# Patient Record
Sex: MALE | Race: WHITE | URBAN - METROPOLITAN AREA
[De-identification: names, ages, dates, MRNs, and addresses within clinical notes are randomized per-mention and may not be internally consistent; named-entity substitution may affect disease eponyms.]

---

## 2023-05-25 ENCOUNTER — NEW PATIENT (OUTPATIENT)
Dept: URBAN - METROPOLITAN AREA CLINIC 20 | Facility: CLINIC | Age: 68
End: 2023-05-25

## 2023-05-25 DIAGNOSIS — Z96.1: ICD-10-CM

## 2023-05-25 DIAGNOSIS — E11.3213: ICD-10-CM

## 2023-05-25 PROCEDURE — 92134 CPTRZ OPH DX IMG PST SGM RTA: CPT

## 2023-05-25 PROCEDURE — 92004 COMPRE OPH EXAM NEW PT 1/>: CPT

## 2023-05-25 ASSESSMENT — VISUAL ACUITY: OD_CC: 20/30

## 2023-05-25 ASSESSMENT — TONOMETRY
OS_IOP_MMHG: 13
OD_IOP_MMHG: 14

## 2023-06-14 ENCOUNTER — ESTABLISHED PATIENT (OUTPATIENT)
Dept: URBAN - METROPOLITAN AREA CLINIC 20 | Facility: CLINIC | Age: 68
End: 2023-06-14

## 2023-06-14 DIAGNOSIS — H35.373: ICD-10-CM

## 2023-06-14 DIAGNOSIS — H43.813: ICD-10-CM

## 2023-06-14 DIAGNOSIS — E11.3213: ICD-10-CM

## 2023-06-14 PROCEDURE — 92201 OPSCPY EXTND RTA DRAW UNI/BI: CPT

## 2023-06-14 PROCEDURE — 92134 CPTRZ OPH DX IMG PST SGM RTA: CPT

## 2023-06-14 PROCEDURE — 67028 INJECTION EYE DRUG: CPT

## 2023-06-14 PROCEDURE — 99214 OFFICE O/P EST MOD 30 MIN: CPT

## 2023-06-14 ASSESSMENT — VISUAL ACUITY
OD_CC: 20/40-2
OS_CC: 20/60-1

## 2023-06-14 ASSESSMENT — TONOMETRY
OS_IOP_MMHG: 12
OD_IOP_MMHG: 12

## 2023-07-19 ENCOUNTER — ESTABLISHED PATIENT (OUTPATIENT)
Dept: URBAN - METROPOLITAN AREA CLINIC 20 | Facility: CLINIC | Age: 68
End: 2023-07-19

## 2023-07-19 DIAGNOSIS — E11.3513: ICD-10-CM

## 2023-07-19 DIAGNOSIS — H35.373: ICD-10-CM

## 2023-07-19 PROCEDURE — 67028 INJECTION EYE DRUG: CPT

## 2023-07-19 PROCEDURE — 92134 CPTRZ OPH DX IMG PST SGM RTA: CPT

## 2023-07-19 ASSESSMENT — VISUAL ACUITY
OD_CC: 20/50
OS_CC: 20/80-2

## 2023-07-19 ASSESSMENT — TONOMETRY
OD_IOP_MMHG: 14
OS_IOP_MMHG: 15

## 2023-08-23 ENCOUNTER — ESTABLISHED PATIENT (OUTPATIENT)
Dept: URBAN - METROPOLITAN AREA CLINIC 20 | Facility: CLINIC | Age: 68
End: 2023-08-23

## 2023-08-23 DIAGNOSIS — E11.3513: ICD-10-CM

## 2023-08-23 DIAGNOSIS — H35.373: ICD-10-CM

## 2023-08-23 PROCEDURE — 67028 INJECTION EYE DRUG: CPT

## 2023-08-23 PROCEDURE — 92134 CPTRZ OPH DX IMG PST SGM RTA: CPT

## 2023-08-23 ASSESSMENT — VISUAL ACUITY
OU_CC: 20/40
OS_CC: 20/70
OD_CC: 20/30-2
OS_PH: 20/60

## 2023-08-23 ASSESSMENT — TONOMETRY
OS_IOP_MMHG: 11
OD_IOP_MMHG: 13

## 2023-09-06 ENCOUNTER — ESTABLISHED PATIENT (OUTPATIENT)
Dept: URBAN - METROPOLITAN AREA CLINIC 20 | Facility: CLINIC | Age: 68
End: 2023-09-06

## 2023-09-06 DIAGNOSIS — E11.3513: ICD-10-CM

## 2023-09-06 DIAGNOSIS — H35.373: ICD-10-CM

## 2023-09-06 DIAGNOSIS — H52.4: ICD-10-CM

## 2023-09-06 PROCEDURE — 92015 DETERMINE REFRACTIVE STATE: CPT

## 2023-09-06 PROCEDURE — 99213 OFFICE O/P EST LOW 20 MIN: CPT

## 2023-09-06 ASSESSMENT — TONOMETRY
OD_IOP_MMHG: 09
OS_IOP_MMHG: 10

## 2023-09-06 ASSESSMENT — VISUAL ACUITY
OS_CC: 20/50
OD_CC: 20/30-1

## 2023-10-04 ENCOUNTER — CLINIC PROCEDURE ONLY (OUTPATIENT)
Dept: URBAN - METROPOLITAN AREA CLINIC 20 | Facility: CLINIC | Age: 68
End: 2023-10-04

## 2023-10-04 DIAGNOSIS — E11.3513: ICD-10-CM

## 2023-10-04 DIAGNOSIS — H35.373: ICD-10-CM

## 2023-10-04 PROCEDURE — 92134 CPTRZ OPH DX IMG PST SGM RTA: CPT

## 2023-10-04 PROCEDURE — 67028 INJECTION EYE DRUG: CPT | Mod: 50,RT

## 2023-10-04 PROCEDURE — 67028 INJECTION EYE DRUG: CPT | Mod: 50,LT

## 2023-10-04 ASSESSMENT — VISUAL ACUITY
OD_CC: 20/40
OS_CC: 20/50

## 2023-10-04 ASSESSMENT — TONOMETRY
OS_IOP_MMHG: 08
OD_IOP_MMHG: 08

## 2023-12-13 ENCOUNTER — CLINIC PROCEDURE ONLY (OUTPATIENT)
Dept: URBAN - METROPOLITAN AREA CLINIC 20 | Facility: CLINIC | Age: 68
End: 2023-12-13

## 2023-12-13 DIAGNOSIS — E11.3513: ICD-10-CM

## 2023-12-13 DIAGNOSIS — H35.373: ICD-10-CM

## 2023-12-13 PROCEDURE — 67028 INJECTION EYE DRUG: CPT

## 2023-12-13 PROCEDURE — 92134 CPTRZ OPH DX IMG PST SGM RTA: CPT

## 2023-12-13 ASSESSMENT — VISUAL ACUITY
OS_SC: 20/50
OD_SC: 20/40

## 2023-12-13 ASSESSMENT — TONOMETRY
OD_IOP_MMHG: 10
OS_IOP_MMHG: 11

## 2024-01-24 ENCOUNTER — ESTABLISHED PATIENT (OUTPATIENT)
Dept: URBAN - METROPOLITAN AREA CLINIC 20 | Facility: CLINIC | Age: 69
End: 2024-01-24

## 2024-01-24 DIAGNOSIS — H43.813: ICD-10-CM

## 2024-01-24 DIAGNOSIS — E11.3513: ICD-10-CM

## 2024-01-24 DIAGNOSIS — H35.373: ICD-10-CM

## 2024-01-24 PROCEDURE — 92201 OPSCPY EXTND RTA DRAW UNI/BI: CPT

## 2024-01-24 PROCEDURE — 92134 CPTRZ OPH DX IMG PST SGM RTA: CPT

## 2024-01-24 PROCEDURE — 92014 COMPRE OPH EXAM EST PT 1/>: CPT

## 2024-01-24 PROCEDURE — 67028 INJECTION EYE DRUG: CPT

## 2024-01-24 ASSESSMENT — TONOMETRY
OS_IOP_MMHG: 12
OD_IOP_MMHG: 9

## 2024-01-24 ASSESSMENT — VISUAL ACUITY
OS_CC: 20/40+2
OD_CC: 20/50-2

## 2024-03-06 ENCOUNTER — CLINIC PROCEDURE ONLY (OUTPATIENT)
Dept: URBAN - METROPOLITAN AREA CLINIC 20 | Facility: CLINIC | Age: 69
End: 2024-03-06

## 2024-03-06 DIAGNOSIS — E11.3513: ICD-10-CM

## 2024-03-06 DIAGNOSIS — H35.373: ICD-10-CM

## 2024-03-06 PROCEDURE — 92134 CPTRZ OPH DX IMG PST SGM RTA: CPT

## 2024-03-06 PROCEDURE — 67028 INJECTION EYE DRUG: CPT

## 2024-03-06 ASSESSMENT — VISUAL ACUITY
OS_PH: 20/50
OS_SC: 20/60
OD_SC: 20/50-2
OU_SC: 20/50

## 2024-03-06 ASSESSMENT — TONOMETRY
OS_IOP_MMHG: 18
OD_IOP_MMHG: 22

## 2024-06-12 ENCOUNTER — FOLLOW UP (OUTPATIENT)
Dept: URBAN - METROPOLITAN AREA CLINIC 20 | Facility: CLINIC | Age: 69
End: 2024-06-12

## 2024-06-12 DIAGNOSIS — H43.12: ICD-10-CM

## 2024-06-12 DIAGNOSIS — H43.813: ICD-10-CM

## 2024-06-12 DIAGNOSIS — H35.373: ICD-10-CM

## 2024-06-12 DIAGNOSIS — E11.3513: ICD-10-CM

## 2024-06-12 DIAGNOSIS — H35.81: ICD-10-CM

## 2024-06-12 PROCEDURE — 99213 OFFICE O/P EST LOW 20 MIN: CPT

## 2024-06-12 PROCEDURE — 92201 OPSCPY EXTND RTA DRAW UNI/BI: CPT

## 2024-06-12 PROCEDURE — 92134 CPTRZ OPH DX IMG PST SGM RTA: CPT

## 2024-06-12 PROCEDURE — 67028 INJECTION EYE DRUG: CPT

## 2024-06-12 ASSESSMENT — TONOMETRY
OD_IOP_MMHG: 10
OS_IOP_MMHG: 9

## 2024-06-12 ASSESSMENT — VISUAL ACUITY
OD_SC: 20/50
OS_SC: 20/60

## 2024-10-16 ENCOUNTER — CLINIC PROCEDURE ONLY (OUTPATIENT)
Dept: URBAN - METROPOLITAN AREA CLINIC 20 | Facility: CLINIC | Age: 69
End: 2024-10-16

## 2024-10-16 DIAGNOSIS — E11.3513: ICD-10-CM

## 2024-10-16 DIAGNOSIS — H35.373: ICD-10-CM

## 2024-10-16 PROCEDURE — 67028 INJECTION EYE DRUG: CPT | Mod: 50,RT

## 2024-10-16 PROCEDURE — 92134 CPTRZ OPH DX IMG PST SGM RTA: CPT

## 2025-01-08 ENCOUNTER — CLINIC PROCEDURE ONLY (OUTPATIENT)
Age: 70
End: 2025-01-08

## 2025-01-08 DIAGNOSIS — E11.3513: ICD-10-CM

## 2025-01-08 PROCEDURE — 92134 CPTRZ OPH DX IMG PST SGM RTA: CPT

## 2025-01-08 PROCEDURE — 67028 INJECTION EYE DRUG: CPT

## 2025-04-30 ENCOUNTER — CLINIC PROCEDURE ONLY (OUTPATIENT)
Age: 70
End: 2025-04-30

## 2025-04-30 DIAGNOSIS — H35.81: ICD-10-CM

## 2025-04-30 DIAGNOSIS — E11.3513: ICD-10-CM

## 2025-04-30 DIAGNOSIS — H43.12: ICD-10-CM

## 2025-04-30 DIAGNOSIS — H35.373: ICD-10-CM

## 2025-04-30 DIAGNOSIS — H43.813: ICD-10-CM

## 2025-04-30 PROCEDURE — 67028 INJECTION EYE DRUG: CPT | Mod: 50,RT

## 2025-04-30 PROCEDURE — 92134 CPTRZ OPH DX IMG PST SGM RTA: CPT

## 2025-04-30 PROCEDURE — J9035225 INJECTION, AVASTIN 2.25 MG: Mod: JZ

## 2025-04-30 PROCEDURE — 99212 OFFICE O/P EST SF 10 MIN: CPT | Mod: 25

## 2025-05-20 ENCOUNTER — P2P PATIENT RECORD (OUTPATIENT)
Age: 70
End: 2025-05-20

## 2025-07-09 ENCOUNTER — OFFICE VISIT (OUTPATIENT)
Dept: URBAN - METROPOLITAN AREA CLINIC 72 | Facility: CLINIC | Age: 70
End: 2025-07-09
Payer: MEDICARE

## 2025-07-09 ENCOUNTER — LAB OUTSIDE AN ENCOUNTER (OUTPATIENT)
Dept: URBAN - METROPOLITAN AREA CLINIC 72 | Facility: CLINIC | Age: 70
End: 2025-07-09

## 2025-07-09 DIAGNOSIS — Z79.01 CURRENT USE OF LONG TERM ANTICOAGULATION: ICD-10-CM

## 2025-07-09 DIAGNOSIS — Z12.11 COLON CANCER SCREENING: ICD-10-CM

## 2025-07-09 PROBLEM — 711150003: Status: ACTIVE | Noted: 2025-07-09

## 2025-07-09 PROCEDURE — 99203 OFFICE O/P NEW LOW 30 MIN: CPT | Performed by: INTERNAL MEDICINE

## 2025-07-09 RX ORDER — LOSARTAN POTASSIUM 50 MG/1
TABLET, FILM COATED ORAL
Qty: 90 EACH | Refills: 1 | Status: ACTIVE | COMMUNITY

## 2025-07-09 RX ORDER — INSULIN LISPRO 100 [IU]/ML
INJECTION, SOLUTION INTRAVENOUS; SUBCUTANEOUS
Qty: 15 MILLILITER | Refills: 1 | Status: ON HOLD | COMMUNITY

## 2025-07-09 RX ORDER — INSULIN DEGLUDEC INJECTION 200 U/ML
INJECTION, SOLUTION SUBCUTANEOUS
Qty: 18 MILLILITER | Status: ACTIVE | COMMUNITY

## 2025-07-09 RX ORDER — METOPROLOL SUCCINATE 50 MG/1
TABLET, EXTENDED RELEASE ORAL
Qty: 90 EACH | Refills: 2 | Status: ACTIVE | COMMUNITY

## 2025-07-09 RX ORDER — FLUOROURACIL 50 MG/G
CREAM TOPICAL
Qty: 40 GRAM | Refills: 0 | Status: ON HOLD | COMMUNITY

## 2025-07-09 RX ORDER — METFORMIN HCL 1000 MG/1
TABLET ORAL
Qty: 180 EACH | Refills: 0 | Status: ACTIVE | COMMUNITY

## 2025-07-09 RX ORDER — CARBIDOPA AND LEVODOPA 25; 100 MG/1; MG/1
TABLET ORAL
Qty: 720 TABLET | Status: ACTIVE | COMMUNITY

## 2025-07-09 RX ORDER — GABAPENTIN 300 MG/1
CAPSULE ORAL
Qty: 270 CAPSULE | Status: ACTIVE | COMMUNITY

## 2025-07-09 RX ORDER — ZOLPIDEM TARTRATE 10 MG/1
TABLET ORAL
Qty: 30 EACH | Refills: 0 | Status: ACTIVE | COMMUNITY

## 2025-07-09 RX ORDER — EZETIMIBE 10 MG/1
TABLET ORAL
Qty: 90 EACH | Refills: 2 | Status: ACTIVE | COMMUNITY

## 2025-07-09 RX ORDER — IPRATROPIUM BROMIDE 42 UG/1
SPRAY, METERED NASAL
Qty: 30 MILLILITER | Refills: 0 | Status: ON HOLD | COMMUNITY

## 2025-07-09 RX ORDER — SACUBITRIL AND VALSARTAN 97; 103 MG/1; MG/1
TABLET, FILM COATED ORAL
Qty: 60 EACH | Refills: 7 | Status: ACTIVE | COMMUNITY

## 2025-07-09 RX ORDER — TRAMADOL HYDROCHLORIDE 50 MG/1
TABLET ORAL
Qty: 12 EACH | Refills: 0 | Status: ON HOLD | COMMUNITY

## 2025-07-09 RX ORDER — POLYETHYLENE GLYCOL 3350, SODIUM SULFATE ANHYDROUS, SODIUM BICARBONATE, SODIUM CHLORIDE, POTASSIUM CHLORIDE 236; 22.74; 6.74; 5.86; 2.97 G/4L; G/4L; G/4L; G/4L; G/4L
4000 ML POWDER, FOR SOLUTION ORAL DAILY
Qty: 4000 ML | Refills: 0 | OUTPATIENT
Start: 2025-07-09 | End: 2025-07-10

## 2025-07-09 RX ORDER — TRAMADOL HYDROCHLORIDE 50 MG/1
TABLET ORAL
Qty: 12 EACH | Refills: 0 | Status: ACTIVE | COMMUNITY

## 2025-07-09 RX ORDER — LEVOTHYROXINE SODIUM 0.05 MG/1
TABLET ORAL
Qty: 90 EACH | Refills: 0 | Status: ACTIVE | COMMUNITY

## 2025-07-09 RX ORDER — CLOPIDOGREL BISULFATE 75 MG/1
TABLET, FILM COATED ORAL
Qty: 90 TABLET | Status: ACTIVE | COMMUNITY

## 2025-07-09 RX ORDER — ROSUVASTATIN 40 MG/1
TABLET, FILM COATED ORAL
Qty: 90 TABLET | Status: ACTIVE | COMMUNITY

## 2025-07-09 RX ORDER — ESOMEPRAZOLE MAGNESIUM 40 MG/1
CAPSULE, DELAYED RELEASE ORAL
Qty: 90 EACH | Refills: 1 | Status: ACTIVE | COMMUNITY

## 2025-07-09 RX ORDER — PRAMIPEXOLE DIHYDROCHLORIDE 0.5 MG/1
TABLET ORAL
Qty: 360 EACH | Refills: 1 | Status: ACTIVE | COMMUNITY

## 2025-07-09 RX ORDER — ESCITALOPRAM 20 MG/1
TABLET, FILM COATED ORAL
Qty: 90 TABLET | Status: ACTIVE | COMMUNITY

## 2025-07-09 RX ORDER — DOXYCYCLINE 100 MG/1
CAPSULE ORAL
Qty: 20 EACH | Refills: 0 | Status: ON HOLD | COMMUNITY

## 2025-07-09 NOTE — EXAM-PHYSICAL EXAM
General- no acute distress, resting comfortably Eyes- anicteric, no pallor HENT- normocephalic, atraumatic head Neck- no lymphadenopathy, symmetric Chest- non labored breathing, equal rise Abdomen- soft, non tender, non distended, no organomegaly Ext: OLIVIA, no obvious sores or rashes  none

## 2025-07-09 NOTE — HPI-TODAY'S VISIT:
Mr. Alva is a 69-year-old who presents as a new patient for consultation for colonoscopy, he was referred by Dr. Pascual Lucas, a copy of this consultation will be forwarded to the referring physician. Has a past medical history of coronary artery disease stroke, hypothyroidism, diabetes, Parkinson's disease, chronic anticoagulation, GERD, anxiety depression and heart failure.  He is chronic constipation has a bowel movement once a week.  He is not on any bowel program.  He had a positive Cologuard a few years ago, had an attempted colonoscopy but could not be completed due to poor prep.  He has not had any follow-up.  He sees blood in his stool.  He denies any abdominal pain.

## 2025-07-29 ENCOUNTER — DASHBOARD ENCOUNTERS (OUTPATIENT)
Age: 70
End: 2025-07-29

## 2025-07-29 ENCOUNTER — OFFICE VISIT (OUTPATIENT)
Dept: URBAN - METROPOLITAN AREA CLINIC 72 | Facility: CLINIC | Age: 70
End: 2025-07-29
Payer: MEDICARE

## 2025-07-29 DIAGNOSIS — K59.00 CONSTIPATION, UNSPECIFIED: ICD-10-CM

## 2025-07-29 PROCEDURE — 99213 OFFICE O/P EST LOW 20 MIN: CPT | Performed by: INTERNAL MEDICINE

## 2025-07-29 RX ORDER — TRAMADOL HYDROCHLORIDE 50 MG/1
1 TABLET AS NEEDED TABLET ORAL DAILY
Qty: 3 TABLET | Refills: 0 | Status: ACTIVE | COMMUNITY

## 2025-07-29 RX ORDER — TRAMADOL HYDROCHLORIDE 50 MG/1
TABLET ORAL
Qty: 12 EACH | Refills: 0 | Status: ON HOLD | COMMUNITY

## 2025-07-29 RX ORDER — ESCITALOPRAM 20 MG/1
1 TABLET TABLET, FILM COATED ORAL DAILY
Qty: 90 TABLET | Status: ACTIVE | COMMUNITY

## 2025-07-29 RX ORDER — ESOMEPRAZOLE MAGNESIUM 40 MG/1
1 CAPSULE 1/2 TO 1 HOUR BEFORE MORNING MEAL CAPSULE, DELAYED RELEASE ORAL DAILY
Refills: 1 | Status: ACTIVE | COMMUNITY

## 2025-07-29 RX ORDER — CARBIDOPA AND LEVODOPA 25; 100 MG/1; MG/1
1 TABLET AS NEEDED TABLET ORAL DAILY
Qty: 90 TABLET | Status: ACTIVE | COMMUNITY

## 2025-07-29 RX ORDER — PRAMIPEXOLE DIHYDROCHLORIDE 0.5 MG/1
1 TABLET TABLET ORAL DAILY
Qty: 90 TABLET | Refills: 1 | Status: ACTIVE | COMMUNITY

## 2025-07-29 RX ORDER — GABAPENTIN 300 MG/1
1 CAPSULE CAPSULE ORAL DAILY
Qty: 90 CAPSULE | Status: ACTIVE | COMMUNITY

## 2025-07-29 RX ORDER — SACUBITRIL AND VALSARTAN 97; 103 MG/1; MG/1
1 TABLET TABLET, FILM COATED ORAL DAILY
Qty: 30 TABLET | Refills: 7 | Status: ACTIVE | COMMUNITY

## 2025-07-29 RX ORDER — METOPROLOL SUCCINATE 50 MG/1
TABLET, EXTENDED RELEASE ORAL
Qty: 90 EACH | Refills: 2 | Status: ACTIVE | COMMUNITY

## 2025-07-29 RX ORDER — INSULIN LISPRO 100 [IU]/ML
INJECTION, SOLUTION INTRAVENOUS; SUBCUTANEOUS
Qty: 15 MILLILITER | Refills: 1 | Status: ON HOLD | COMMUNITY

## 2025-07-29 RX ORDER — INSULIN DEGLUDEC INJECTION 200 U/ML
INJECTION, SOLUTION SUBCUTANEOUS
Qty: 18 MILLILITER | Status: ACTIVE | COMMUNITY

## 2025-07-29 RX ORDER — METFORMIN HCL 1000 MG/1
1 TABLET WITH A MEAL TABLET ORAL DAILY
Qty: 90 TABLET | Refills: 0 | Status: ACTIVE | COMMUNITY

## 2025-07-29 RX ORDER — EZETIMIBE 10 MG/1
1 TABLET TABLET ORAL DAILY
Qty: 90 TABLET | Refills: 2 | Status: ACTIVE | COMMUNITY

## 2025-07-29 RX ORDER — DOXYCYCLINE 100 MG/1
CAPSULE ORAL
Qty: 20 EACH | Refills: 0 | Status: ON HOLD | COMMUNITY

## 2025-07-29 RX ORDER — LEVOTHYROXINE SODIUM 0.05 MG/1
1 TABLET IN THE MORNING ON AN EMPTY STOMACH TABLET ORAL DAILY
Qty: 90 TABLET | Refills: 0 | Status: ACTIVE | COMMUNITY

## 2025-07-29 RX ORDER — ROSUVASTATIN 40 MG/1
1 TABLET TABLET, FILM COATED ORAL DAILY
Qty: 90 TABLET | Status: ACTIVE | COMMUNITY

## 2025-07-29 RX ORDER — ZOLPIDEM TARTRATE 10 MG/1
1 TABLET AT BEDTIME AS NEEDED TABLET ORAL DAILY
Qty: 30 TABLET | Refills: 0 | Status: ACTIVE | COMMUNITY

## 2025-07-29 RX ORDER — LOSARTAN POTASSIUM 50 MG/1
1 TABLET TABLET, FILM COATED ORAL DAILY
Qty: 90 TABLET | Refills: 1 | Status: ACTIVE | COMMUNITY

## 2025-07-29 RX ORDER — CLOPIDOGREL BISULFATE 75 MG/1
1 TABLET TABLET, FILM COATED ORAL DAILY
Qty: 90 TABLET | Status: ACTIVE | COMMUNITY

## 2025-07-29 RX ORDER — FLUOROURACIL 50 MG/G
CREAM TOPICAL
Qty: 40 GRAM | Refills: 0 | Status: ON HOLD | COMMUNITY

## 2025-07-29 RX ORDER — IPRATROPIUM BROMIDE 42 UG/1
SPRAY, METERED NASAL
Qty: 30 MILLILITER | Refills: 0 | Status: ON HOLD | COMMUNITY

## 2025-07-29 NOTE — EXAM-PHYSICAL EXAM
General- no acute distress, resting comfortably Eyes- anicteric, no pallor HENT- normocephalic, atraumatic head Neck- no lymphadenopathy, symmetric Chest- non labored breathing, equal rise Abdomen- soft, non tender, non distended, no organomegaly Ext: OLIVIA, no obvious sores or rashes

## 2025-07-29 NOTE — HPI-TODAY'S VISIT:
Sebastián Alva, a 69-year-old male, presented for a chronic condition follow-up focused on constipation management in the context of his longstanding Parkinson's disease. Living with Parkinson's since age 42, he described the persistent challenge of chronic constipation, noting that his bowel movements typically occur every two to three days. He reported that his stools are now softer and not hard, though achieving regularity remains difficult. Sebastián expressed concern about how constipation affects his quality of life, particularly its impact on his comfort and ability to eat. He detailed his ongoing efforts to manage these symptoms, including his current regimen of Miralax-one scoop four times daily-and his openness to adjusting this approach. Sebastián discussed his experiences with various bowel preps and stimulant laxatives, reflecting a willingness to try new strategies for better symptom control. He denied having hard stools at present and did not view daily bowel movements as necessary, focusing instead on consistency and comfort. In the broader context of his Parkinson's disease, Sebastián acknowledged the movement disorder's role in his gastrointestinal symptoms and voiced frustration with the chronic nature of his condition. He remains engaged in discussions about optimizing his bowel regimen as an integral part of managing his Parkinson's disease and maintaining his overall quality of life.

## 2025-08-15 ENCOUNTER — WEB ENCOUNTER (OUTPATIENT)
Dept: URBAN - METROPOLITAN AREA CLINIC 72 | Facility: CLINIC | Age: 70
End: 2025-08-15

## 2025-08-20 ENCOUNTER — CLINIC PROCEDURE ONLY (OUTPATIENT)
Age: 70
End: 2025-08-20

## 2025-08-20 DIAGNOSIS — H35.373: ICD-10-CM

## 2025-08-20 DIAGNOSIS — E11.3513: ICD-10-CM

## 2025-08-20 DIAGNOSIS — H43.12: ICD-10-CM

## 2025-08-20 DIAGNOSIS — H43.813: ICD-10-CM

## 2025-08-20 DIAGNOSIS — H35.81: ICD-10-CM

## 2025-08-20 PROCEDURE — 92134 CPTRZ OPH DX IMG PST SGM RTA: CPT

## 2025-08-20 PROCEDURE — 67028 INJECTION EYE DRUG: CPT

## 2025-08-20 PROCEDURE — J9035JZ AVASTIN, NO DRUG WASTE

## 2025-08-20 PROCEDURE — 92014 COMPRE OPH EXAM EST PT 1/>: CPT | Mod: 25
